# Patient Record
Sex: FEMALE | Race: WHITE | Employment: FULL TIME | URBAN - METROPOLITAN AREA
[De-identification: names, ages, dates, MRNs, and addresses within clinical notes are randomized per-mention and may not be internally consistent; named-entity substitution may affect disease eponyms.]

---

## 2018-02-26 ENCOUNTER — APPOINTMENT (OUTPATIENT)
Dept: GENERAL RADIOLOGY | Age: 48
End: 2018-02-26
Attending: PHYSICIAN ASSISTANT
Payer: COMMERCIAL

## 2018-02-26 ENCOUNTER — HOSPITAL ENCOUNTER (EMERGENCY)
Age: 48
Discharge: HOME HEALTH | End: 2018-02-26
Attending: EMERGENCY MEDICINE
Payer: COMMERCIAL

## 2018-02-26 VITALS
WEIGHT: 220 LBS | HEIGHT: 67 IN | BODY MASS INDEX: 34.53 KG/M2 | RESPIRATION RATE: 16 BRPM | SYSTOLIC BLOOD PRESSURE: 160 MMHG | OXYGEN SATURATION: 97 % | HEART RATE: 95 BPM | TEMPERATURE: 98.5 F | DIASTOLIC BLOOD PRESSURE: 95 MMHG

## 2018-02-26 DIAGNOSIS — R03.0 ELEVATED BLOOD PRESSURE READING: ICD-10-CM

## 2018-02-26 DIAGNOSIS — J18.9 PNEUMONIA OF RIGHT LOWER LOBE DUE TO INFECTIOUS ORGANISM: Primary | ICD-10-CM

## 2018-02-26 PROCEDURE — 71046 X-RAY EXAM CHEST 2 VIEWS: CPT

## 2018-02-26 PROCEDURE — 99282 EMERGENCY DEPT VISIT SF MDM: CPT

## 2018-02-26 RX ORDER — AZITHROMYCIN 250 MG/1
TABLET, FILM COATED ORAL
Qty: 6 TAB | Refills: 0 | Status: SHIPPED | OUTPATIENT
Start: 2018-02-26

## 2018-02-26 RX ORDER — CODEINE PHOSPHATE AND GUAIFENESIN 10; 100 MG/5ML; MG/5ML
5 SOLUTION ORAL
Qty: 120 ML | Refills: 0 | Status: SHIPPED | OUTPATIENT
Start: 2018-02-26 | End: 2018-02-26

## 2018-02-26 NOTE — LETTER
[facility] EMERGENCY DEPT 
[facility] [phone] 
[phone] Work/School Note Date: 2/26/2018 To Whom It May concern: 
 
Bennett Anna was seen and treated today in the emergency room by the following provider(s): 
Attending Provider: Arsen Tinoco DO Physician Assistant: Tran Palacio PA-C. Bennett Anna may return to work on 2/28/18. Sincerely, Tran Palacio PA-C

## 2018-02-26 NOTE — ED PROVIDER NOTES
EMERGENCY DEPARTMENT HISTORY AND PHYSICAL EXAM    Date: 2/26/2018  Patient Name: Bennett Anna    History of Presenting Illness     Chief Complaint   Patient presents with    Cough         History Provided By: Patient    Chief Complaint: cough  Duration: 1 week  Timing:  Constant  Location: chest  Modifying Factors: Took Mucinex with no relief of sx  Associated Symptoms: Fever amd chest congestion      Additional History (Context): Bennett Anna is a 56 y.o. female with Rheumatoid arthritis who presents with a constant cough that has persisted over the last week. Pt tried Mucinex with no relief of sx. Associated sx are fever (subjective) and chest congestion. She denies any sick contact. Pt has no shx of tobacco and alcohol use. PCP: Luisa Cheung MD    Current Outpatient Prescriptions   Medication Sig Dispense Refill    guaiFENesin-codeine (ROBITUSSIN AC) 100-10 mg/5 mL solution Take 5 mL by mouth nightly as needed for Cough. Max Daily Amount: 5 mL. 120 mL 0    ferrous sulfate 325 mg (65 mg iron) tablet   3    CHOLECALCIFEROL, VITAMIN D3, (VITAMIN D3 PO) Take  by mouth every seven (7) days.  aspirin delayed-release 81 mg tablet Take 81 mg by mouth two (2) times a day.  FOLIC ACID PO Take  by mouth.  METHOTREXATE by Does Not Apply route.  norethindrone-ethinyl estradiol-iron (LOESTRIN FE 1.5/30, 28,) 1.5-30 mg-mcg tablet Take 1 Tab by mouth daily. Past History     Past Medical History:  Past Medical History:   Diagnosis Date    RA (rheumatoid arthritis) ([facility] [address]        Past Surgical History:  History reviewed. No pertinent surgical history. Family History:  History reviewed. No pertinent family history. Social History:  Social History   Substance Use Topics    Smoking status: Never Smoker    Smokeless tobacco: Never Used    Alcohol use Yes       Allergies:  No Known Allergies      Review of Systems   Review of Systems   Constitutional: Positive for fever (subjective). HENT: Positive for congestion. Respiratory: Positive for cough. Negative for shortness of breath and wheezing. Cardiovascular: Negative for chest pain. All other systems reviewed and are negative. All Other Systems Negative  Physical Exam     Vitals:    02/26/18 1409   BP: (!) 160/95   Pulse: 95   Resp: 16   Temp: 98.5 °F (36.9 °C)   SpO2: 97%   Weight: 99.8 kg (220 lb)   Height: 5' 7\" (1.702 m)     Physical Exam   Constitutional: She is oriented to person, place, and time. She appears well-developed and well-nourished. No distress. HENT:   Head: Normocephalic and atraumatic. Right Ear: Tympanic membrane and ear canal normal.   Left Ear: Tympanic membrane and ear canal normal.   Mouth/Throat: Uvula is midline, oropharynx is clear and moist and mucous membranes are normal.   Eyes: Conjunctivae are normal.   Neck: Normal range of motion. Neck supple. Cardiovascular: Normal rate, regular rhythm and normal heart sounds. Pulmonary/Chest: Effort normal and breath sounds normal. No respiratory distress. She has no wheezes. She has no rales. Musculoskeletal: Normal range of motion. Neurological: She is alert and oriented to person, place, and time. Skin: Skin is warm and dry. Psychiatric: She has a normal mood and affect. Her behavior is normal. Judgment and thought content normal.   Nursing note and vitals reviewed. Diagnostic Study Results     Labs -   No results found for this or any previous visit (from the past 12 hour(s)). Radiologic Studies -   XR CHEST PA LAT    (Results Pending)     CT Results  (Last 48 hours)    None        CXR Results  (Last 48 hours)    None            Medical Decision Making   I am the first provider for this patient. I reviewed the vital signs, available nursing notes, past medical history, past surgical history, family history and social history.     Records Reviewed: Nursing Notes    Procedures:  Procedures    Provider Notes (Medical Decision Making):     DDX: bronchitis, pna, flu    Pt well appearing and in NAD. Nothing acute on CXR. Most likely viral. Will d/h to f/u with PCP for further eval.     MED RECONCILIATION:  No current facility-administered medications for this encounter. Current Outpatient Prescriptions   Medication Sig    guaiFENesin-codeine (ROBITUSSIN AC) 100-10 mg/5 mL solution Take 5 mL by mouth nightly as needed for Cough. Max Daily Amount: 5 mL.  ferrous sulfate 325 mg (65 mg iron) tablet     CHOLECALCIFEROL, VITAMIN D3, (VITAMIN D3 PO) Take  by mouth every seven (7) days.  aspirin delayed-release 81 mg tablet Take 81 mg by mouth two (2) times a day.  FOLIC ACID PO Take  by mouth.  METHOTREXATE by Does Not Apply route.  norethindrone-ethinyl estradiol-iron (LOESTRIN FE 1.5/30, 28,) 1.5-30 mg-mcg tablet Take 1 Tab by mouth daily. Disposition:  home    DISCHARGE NOTE:   Pt has been reexamined. Patient has no new complaints, changes, or physical findings. Care plan outlined and precautions discussed. Results of CXR were reviewed with the patient. All medications were reviewed with the patient; will d/c home with robitussin ac. All of pt's questions and concerns were addressed. Patient was instructed and agrees to follow up with PCP, as well as to return to the ED upon further deterioration. Patient is ready to go home. Follow-up Information     Follow up With Details Comments 550 Devin Ulloa MD In 1 week For further evaluation [address] [place] [id number] [phone]      [facility] EMERGENCY DEPT  Immediately if symptoms worsen [address]  [phone]          Current Discharge Medication List      START taking these medications    Details   guaiFENesin-codeine (ROBITUSSIN AC) 100-10 mg/5 mL solution Take 5 mL by mouth nightly as needed for Cough. Max Daily Amount: 5 mL.   Qty: 120 mL, Refills: 0    Associated Diagnoses: Acute bronchitis, unspecified organism         CONTINUE these medications which have NOT CHANGED    Details   ferrous sulfate 325 mg (65 mg iron) tablet Refills: 3      CHOLECALCIFEROL, VITAMIN D3, (VITAMIN D3 PO) Take  by mouth every seven (7) days. aspirin delayed-release 81 mg tablet Take 81 mg by mouth two (2) times a day. FOLIC ACID PO Take  by mouth. METHOTREXATE by Does Not Apply route. norethindrone-ethinyl estradiol-iron (LOESTRIN FE 1.5/30, 28,) 1.5-30 mg-mcg tablet Take 1 Tab by mouth daily. Diagnosis     Clinical Impression:   1. Acute bronchitis, unspecified organism    2. Elevated blood pressure reading        [address] acting as a scribe for and in the presence of Antonio Gaona        February 26, 2018 at 2:11 PM       Provider Attestation:      I personally performed the services described in the documentation, reviewed the documentation, as recorded by the scribe in my presence, and it accurately and completely records my words and actions.  February 26, 2018 at 2:11 PM - Antonio Gaona

## 2018-02-26 NOTE — DISCHARGE INSTRUCTIONS
Bronchitis: Care Instructions  Your Care Instructions    Bronchitis is inflammation of the bronchial tubes, which carry air to the lungs. The tubes swell and produce mucus, or phlegm. The mucus and inflamed bronchial tubes make you cough. You may have trouble breathing. Most cases of bronchitis are caused by viruses like those that cause colds. Antibiotics usually do not help and they may be harmful. Bronchitis usually develops rapidly and lasts about 2 to 3 weeks in otherwise healthy people. Follow-up care is a key part of your treatment and safety. Be sure to make and go to all appointments, and call your doctor if you are having problems. It's also a good idea to know your test results and keep a list of the medicines you take. How can you care for yourself at home? · Take all medicines exactly as prescribed. Call your doctor if you think you are having a problem with your medicine. · Get some extra rest.  · Take an over-the-counter pain medicine, such as acetaminophen (Tylenol), ibuprofen (Advil, Motrin), or naproxen (Aleve) to reduce fever and relieve body aches. Read and follow all instructions on the label. · Do not take two or more pain medicines at the same time unless the doctor told you to. Many pain medicines have acetaminophen, which is Tylenol. Too much acetaminophen (Tylenol) can be harmful. · Take an over-the-counter cough medicine that contains dextromethorphan to help quiet a dry, hacking cough so that you can sleep. Avoid cough medicines that have more than one active ingredient. Read and follow all instructions on the label. · Breathe moist air from a humidifier, hot shower, or sink filled with hot water. The heat and moisture will thin mucus so you can cough it out. · Do not smoke. Smoking can make bronchitis worse. If you need help quitting, talk to your doctor about stop-smoking programs and medicines. These can increase your chances of quitting for good.   When should you call for help? Call 911 anytime you think you may need emergency care. For example, call if:  ? · You have severe trouble breathing. ?Call your doctor now or seek immediate medical care if:  ? · You have new or worse trouble breathing. ? · You cough up dark brown or bloody mucus (sputum). ? · You have a new or higher fever. ? · You have a new rash. ? Watch closely for changes in your health, and be sure to contact your doctor if:  ? · You cough more deeply or more often, especially if you notice more mucus or a change in the color of your mucus. ? · You are not getting better as expected. Where can you learn more? Go to http://nenita-gabe.info/. Enter H333 in the search box to learn more about \"Bronchitis: Care Instructions. \"  Current as of: May 12, 2017  Content Version: 11.4  © 0410-9527 [employer]. Care instructions adapted under license by [employer] (which disclaims liability or warranty for this information). If you have questions about a medical condition or this instruction, always ask your healthcare professional. [address] any warranty or liability for your use of this information.

## 2019-03-11 ENCOUNTER — HOSPITAL ENCOUNTER (OUTPATIENT)
Dept: LAB | Age: 49
Discharge: HOME | End: 2019-03-11
Payer: COMMERCIAL

## 2019-03-11 PROCEDURE — 88175 CYTOPATH C/V AUTO FLUID REDO: CPT

## 2019-03-11 PROCEDURE — 87624 HPV HI-RISK TYP POOLED RSLT: CPT

## 2019-03-12 ENCOUNTER — HOSPITAL ENCOUNTER (OUTPATIENT)
Dept: MAMMOGRAPHY | Age: 49
Discharge: HOME | End: 2019-03-12
Attending: OBSTETRICS & GYNECOLOGY
Payer: COMMERCIAL

## 2019-03-12 ENCOUNTER — HOSPITAL ENCOUNTER (OUTPATIENT)
Dept: ULTRASOUND IMAGING | Age: 49
Discharge: HOME | End: 2019-03-12
Attending: OBSTETRICS & GYNECOLOGY
Payer: COMMERCIAL

## 2019-03-12 DIAGNOSIS — N63.0 BREAST MASS: ICD-10-CM

## 2019-03-12 PROCEDURE — 76642 ULTRASOUND BREAST LIMITED: CPT

## 2019-03-12 PROCEDURE — 77062 BREAST TOMOSYNTHESIS BI: CPT

## 2019-03-15 ENCOUNTER — HOSPITAL ENCOUNTER (OUTPATIENT)
Dept: MAMMOGRAPHY | Age: 49
Discharge: HOME | End: 2019-03-15
Attending: OBSTETRICS & GYNECOLOGY
Payer: COMMERCIAL

## 2019-03-15 VITALS — WEIGHT: 220 LBS | HEIGHT: 67 IN | BODY MASS INDEX: 34.53 KG/M2

## 2019-03-15 DIAGNOSIS — R92.8 ABNORMAL MAMMOGRAM OF RIGHT BREAST: ICD-10-CM

## 2019-03-15 DIAGNOSIS — R92.1 BREAST CALCIFICATIONS: ICD-10-CM

## 2019-03-15 PROCEDURE — 88360 TUMOR IMMUNOHISTOCHEM/MANUAL: CPT

## 2019-03-15 PROCEDURE — 19081 BX BREAST 1ST LESION STRTCTC: CPT

## 2019-03-15 PROCEDURE — 74011250636 HC RX REV CODE- 250/636: Performed by: OBSTETRICS & GYNECOLOGY

## 2019-03-15 PROCEDURE — 88305 TISSUE EXAM BY PATHOLOGIST: CPT

## 2019-03-15 PROCEDURE — 74011000250 HC RX REV CODE- 250: Performed by: OBSTETRICS & GYNECOLOGY

## 2019-03-15 RX ORDER — LIDOCAINE HYDROCHLORIDE AND EPINEPHRINE 10; 10 MG/ML; UG/ML
10 INJECTION, SOLUTION INFILTRATION; PERINEURAL
Status: COMPLETED | OUTPATIENT
Start: 2019-03-15 | End: 2019-03-15

## 2019-03-15 RX ORDER — LIDOCAINE HYDROCHLORIDE 10 MG/ML
10 INJECTION, SOLUTION EPIDURAL; INFILTRATION; INTRACAUDAL; PERINEURAL
Status: COMPLETED | OUTPATIENT
Start: 2019-03-15 | End: 2019-03-15

## 2019-03-15 RX ADMIN — LIDOCAINE HYDROCHLORIDE 5 ML: 10 INJECTION, SOLUTION EPIDURAL; INFILTRATION; INTRACAUDAL; PERINEURAL at 13:43

## 2019-03-15 RX ADMIN — LIDOCAINE HYDROCHLORIDE,EPINEPHRINE BITARTRATE 10 ML: 10; .01 INJECTION, SOLUTION INFILTRATION; PERINEURAL at 13:48

## 2019-03-15 NOTE — PROGRESS NOTES
Patient arrived for a Right breast stereotactic biopsy with clip placement. Pt arrived in [facility] Mammography ambulatory, alert and oriented. Pt tolerated biopsy well without complaint. Specimens obtained, x-rayed and placed in labeled specimen containers for pathology. Direct pressure applied to incision site, bleeding controlled, steri- strips applied and covered with gauze prior to post mammogram films for confirmation of clip placement. Clean dry gauze and Ice pack applied and bra on. Reviewed post procedure discharge instructions with patient. Patient verbalizes understanding and written copy given to patient.  Patient D/C'd at 2:19pm.

## 2019-04-09 ENCOUNTER — HOSPITAL ENCOUNTER (OUTPATIENT)
Dept: LAB | Age: 49
Discharge: HOME | End: 2019-04-09
Payer: COMMERCIAL

## 2019-04-09 PROCEDURE — 88305 TISSUE EXAM BY PATHOLOGIST: CPT

## 2019-04-23 ENCOUNTER — HOSPITAL ENCOUNTER (OUTPATIENT)
Dept: NUCLEAR MEDICINE | Age: 49
Discharge: HOME | End: 2019-04-23
Attending: SPECIALIST
Payer: COMMERCIAL

## 2019-04-23 ENCOUNTER — APPOINTMENT (OUTPATIENT)
Dept: OTHER | Age: 49
End: 2019-04-23
Attending: SPECIALIST
Payer: COMMERCIAL

## 2019-04-23 ENCOUNTER — HOSPITAL ENCOUNTER (OUTPATIENT)
Dept: GENERAL RADIOLOGY | Age: 49
Discharge: HOME | End: 2019-04-23
Attending: SPECIALIST
Payer: COMMERCIAL

## 2019-04-23 DIAGNOSIS — C50.411 MALIGNANT NEOPLASM OF UPPER-OUTER QUADRANT OF RIGHT FEMALE BREAST (HCC): ICD-10-CM

## 2019-04-23 PROCEDURE — 78306 BONE IMAGING WHOLE BODY: CPT

## 2019-04-23 PROCEDURE — 71046 X-RAY EXAM CHEST 2 VIEWS: CPT

## 2019-12-03 ENCOUNTER — HOSPITAL ENCOUNTER (OUTPATIENT)
Dept: PHYSICAL THERAPY | Age: 49
Discharge: HOME | End: 2019-12-03
Payer: COMMERCIAL

## 2019-12-03 PROCEDURE — 97535 SELF CARE MNGMENT TRAINING: CPT

## 2019-12-03 PROCEDURE — 97140 MANUAL THERAPY 1/> REGIONS: CPT

## 2019-12-03 PROCEDURE — 97162 PT EVAL MOD COMPLEX 30 MIN: CPT

## 2019-12-03 NOTE — PROGRESS NOTES
[facility]  [address] [facility] [address], [place], [address] - Phone: [phone]  Fax: [phone] / [address]  Patient Name: Miguel Márquez : 1970   Medical   Diagnosis: Lymphedema of upper extremity [I89.0] Treatment Diagnosis: Right breast lymphedema grade 2, right UE mechanical dysfunction   Onset Date: 19     Referral Source: Thierno aVzquez MD [facility]): 12/3/2019   Prior Hospitalization: See medical history Provider #: [id number]   Prior Level of Function: Independent with ADL's, stocked shelves at [employer]   Comorbidities: RA, breast CA   Medications: Verified on Patient Summary List   The Plan of Care and following information is based on the information from the initial evaluation.   ===========================================================================================  Assessment / key information:  Miguel Márquez is a 51 y.o.  female with Dx: Lymphedema of upper extremity [I89.0], signs and symptoms consistent with grade 2 right breast lymphedema and right UE mechanical dysfunction. Pt presents to PT with symptoms of right arm weakness following a mastectomy on 19. In addition to UE symptoms, the pt reports severe LBP which brought her to the ER the end of November. While at the hospital, CT scans and bone scans were performed to rule out metastasis and metastasis was confirmed negative. The pt's symptoms were then deemed due to RA and associated inflammation on her spine leading to her severe LBP. 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 The pt will be undergoing radiation once her incision is healed. Objective:  The pt demonstrates the following functional deficits: 1) grade 2 right breast edema impeding proper healing of incision and decreasing activity tolerance for appropriate thoracic extension needed for upright posturing, 2) decreased right shoulder AROM/PROM (110/150 flexion and abduction right, 180 degrees left), 3) decrease right shoulder girdle MMT (3-/5 right flexion/abduction/ER, 4/5 left), 4) forward-flexed postural dysfunction, 5) decreased C/S rotation AROM (45 degrees right, 55 degrees left). UE girth was noted, but not measures this visit due to time constraints. Will measure next follow-up visit. The pt does not demonstrate any gross assymetry, as far as girth is concerned, between the two UE's. The patient would benefit from skilled PT to address the above listed impairments.  Thank you for your referral.  ===========================================================================================  Eval Complexity: History: MEDIUM  Complexity : 1-2 comorbidities / personal factors will impact the outcome/ POC Exam:HIGH Complexity : 4+ Standardized tests and measures addressing body structure, function, activity limitation and / or participation in recreation  Presentation: MEDIUM Complexity : Evolving with changing characteristics  Clinical Decision Making:MEDIUM Complexity : FOTO score of 26-74Overall Complexity:MEDIUM    Problem List: pain affecting function, decrease ROM, decrease strength, edema affecting function, decrease ADL/ functional abilitiies, decrease activity tolerance and decrease flexibility/ joint mobility   Treatment Plan may include any combination of the following: Therapeutic exercise, Therapeutic activities, Neuromuscular re-education, Physical agent/modality, Gait/balance training, Manual therapy, Patient education and Self Care training    Treatment Protocol:    o Manual Lymphatic Drainage   o Soft Tissue Mobilization/MFR   o Compression Bandaging   o Prescription of Pneumatic Compression Pump   o Decongestive Exercises/Self MFR/Strengthening   o Education   o Compression Garment    Patient / Family readiness to learn indicated by: asking questions, trying to perform skills and interest  Persons(s) to be included in education: patient (P)  Barriers to Learning/Limitations: None  Measures taken: na   Patient Goal (s): \"I want to  Be able to lift my arm higher. \"   Patient self reported health status: fair  Rehabilitation Potential: good   Short Term Goals: To be accomplished in  2-3  weeks:  1. Patient will demonstrate compliance with HEP for symptom management at home. 2. Patient will require cueing less than 25% of the manual lymph drainage sequence for the right breast to decrease right chest wall edema and decrease postural dysfunction. 3. Patient will demonstrate at least 125 degrees of right shoulder flexion and abduction AROM to improve efficiency with dressing ADL's.  Long Term Goals: To be accomplished in  4-6  weeks:  1. Patient will be independent with HEP to self-manage and prevent symptoms upon DC. 2.  Patient demonstrate at least 4/5 shoulder ER MMT bilaterally to improve postural stability with overhead ADL performance. 3.  Patient will don and doff a sigvaris swell spot and compression tank modified-independently to improve overhead AROM tolerance. Frequency / Duration:   Patient to be seen  2-3  times per week for 3-4  weeks:  Patient / Caregiver education and instruction: self care    Therapist Signature: Scottie Fulton DPT Date: 83/2/1043   Certification Period: NA Time: 12:34 PM   ===========================================================================================  I certify that the above Physical Therapy Services are being furnished while the patient is under my care. I agree with the treatment plan and certify that this therapy is necessary. Physician Signature:        Date:       Time:     Please sign and return to [employer] or you may fax the signed copy to [phone]. Thank you.

## 2019-12-03 NOTE — PROGRESS NOTES
Lymphedema EVAL/DAILY NOTE    Patient Name: Alex Spaulding  Date:12/3/2019  : 1970  [x]  Patient  Verified  Payor: Karla Gonzalez / Plan: [address] [place] [address] / Product Type: PPO /    In time:9:15  Out time:10:10  Total Treatment Time (min): 55  Total Timed Codes (min): 25  1:1 Treatment Time (MC/BCBS only): 25   Visit #: 1     Referring Provider: José Forrest MD  Next Appointment: 12/10/19  Treatment Area: Lymphedema of upper extremity [I89.0]    Pain Level (0-10 scale): 0/10    Personal Goal:  \"I want to  Be able to lift my arm higher. \"    Home Situation (DME-pump, family suppor):NA    Dominant Hand: right handed    Current or Previous Compression Garment(s):   []Stocking []Sleeve  [] Pantyhose                          [] Glove/gauntlet/toe                           []Brand:                          []mmHg:   [] Size:     Compression Pump:  []Brand:                                     []Date Prescribed:    Has your activity changed since diagnosis? yes    Limitations/Prior level of functioning:   · Fatigue Intensity: 5 on a scale of 0-10  · Distress Intensity: 2 on a scale of 0-10  Factors/Comments: NA    Subjective functional status:     Present Symptoms/History: Pt presents to PT with symptoms of right arm weakness following a mastectomy on 19. In addition to UE symptoms, the pt reports severe LBP which brought her to the ER the end of November. While at the hospital, CT scans and bone scans were performed to rule out metastasis and metastasis was confirmed negative. The pt's symptoms were then deemed due to RA and associated inflammation on her spine leading to her severe LBP. The pt had a blood clot in her carotid artery during chemotherapy which is being monitored with medical treatment.     Medical Oncologist: Dr. Medina Traskwood Oncologist: Dr. Berna Viveros   Primary Care Physician: Quincy Marroquin MD    Date of first cancer diagnosis/type/stage: 2019 stage II HER 2+, ER-    Surgery:right radical mastectomy with 11 axillary lymph nodes removed   Radiation:   Type external beam   Field right breast   Date of Completion [id number]  Number of Treatmentsunknown                   Side Effects Encountered: unknown    Chemotherapy:    Dates November    Side Effects Encountered: peripheral neuropathy hands and feet, chemo brain, decreased appetite    Other Treatment:(clinical trial or genetic counseling)    Recurrence      Precautions/Indications:     Diagnostic tests (imaging/bone scan/labs): see above       OBJECTIVE:   Edema:  []min []moderate  [] severe [] pitting  Circumferences:         Skin Integrity      Sensation  []normal [] sensitive  [] decreased  Light touch/Sharp/Dull                Color []normal []red  [] pink               Scars/Burns/incisions:                Wounds: location:                      size:               depth:  Posture:    ROM:    Strength:    Breath Pattern Assessment:   Diaphragm___________________   Anterior Chest________________   Accessory Muscle Use______________        30 min []Eval                  []Re-Eval     15 min Manual Therapy:  Breast manual lymph drainage demonstration   Rationale: decrease pain, increase ROM, increase tissue extensibility and decrease edema  to improve healing of incision and improve overhead AROM tolerance. 10 min Self Care/Home Management: skin care routine implementation, garment care education, garment selection and fitting, HEP education   Rationale: { to improve the patients skin health in order to prevent infection.           With   [] TE   [] TA   [] neuro   [] other: Patient Education: [x] Review HEP    [] Progressed/Changed HEP based on:   [] positioning   [] body mechanics   [] transfers   [] heat/ice application    [] other:      Other Objective/Functional Measures:      Pain Level (0-10 scale) post treatment: 0    ASSESSMENT/Changes in Function: See POC    Justification for Eval Code Complexity: MODERATE  Patient History (low 0, mod 1-2, high 3-4): Rhematoid Arthritis, Breast CA MODERATE   Examination (low 1-2, mod 3+, high 4+): UE AROM, UE MMT, UE girth, breast edema assessment HIGH  Clinical Presentation (low: stable/uncomplicated; mod: evolving; high: unstable/unpredictable): evolving symptoms with manual lymph drainage MODERATE  Clinical Decision Making (low , mod 26-74, high 1-25): grade II lymphedema, breast MODERATE           PLAN  []  Upgrade activities as tolerated     []  Continue plan of care  []  Update interventions per flow sheet       []  Discharge due to:_  []  Other:_      Colby Ramirez 12/3/2019  9:25 AM

## 2019-12-09 ENCOUNTER — APPOINTMENT (OUTPATIENT)
Dept: PHYSICAL THERAPY | Age: 49
End: 2019-12-09
Payer: COMMERCIAL

## 2020-02-04 ENCOUNTER — HOSPITAL ENCOUNTER (OUTPATIENT)
Dept: LAB | Age: 50
Discharge: HOME | End: 2020-02-04
Payer: COMMERCIAL

## 2020-02-04 PROCEDURE — 87624 HPV HI-RISK TYP POOLED RSLT: CPT

## 2020-02-04 PROCEDURE — 88175 CYTOPATH C/V AUTO FLUID REDO: CPT
